# Patient Record
Sex: MALE | Race: WHITE
[De-identification: names, ages, dates, MRNs, and addresses within clinical notes are randomized per-mention and may not be internally consistent; named-entity substitution may affect disease eponyms.]

---

## 2020-04-16 NOTE — RAD
PA and lateral views of the chest. 

 

Comparison:  Chest radiograph dated 4/7/2014.

 

Indication:  Shortness of air

 

Findings:

 

Normal lung volume. No focal airspace disease. Normal pulmonary 

vasculature. No pleural effusion. No pneumothorax. The cardiomediastinal 

silhouette is normal in appearance. The great vessels are normal. No acute

osseous abnormality.

 

Impression: 

No acute cardiopulmonary process.

 

Electronically signed by: Urban Tompkins MD (4/16/2020 9:20 AM) 

[id number]

## 2020-06-08 ENCOUNTER — HOSPITAL ENCOUNTER (EMERGENCY)
Dept: HOSPITAL 63 - ER | Age: 35
Discharge: HOME | End: 2020-06-08
Payer: COMMERCIAL

## 2020-06-08 VITALS — WEIGHT: 222.67 LBS | BODY MASS INDEX: 29.51 KG/M2 | HEIGHT: 73 IN

## 2020-06-08 VITALS — DIASTOLIC BLOOD PRESSURE: 95 MMHG | SYSTOLIC BLOOD PRESSURE: 146 MMHG

## 2020-06-08 DIAGNOSIS — J45.909: ICD-10-CM

## 2020-06-08 DIAGNOSIS — R20.2: ICD-10-CM

## 2020-06-08 DIAGNOSIS — R07.9: ICD-10-CM

## 2020-06-08 DIAGNOSIS — R03.0: Primary | ICD-10-CM

## 2020-06-08 LAB
ALBUMIN SERPL-MCNC: 4.2 G/DL (ref 3.4–5)
ALBUMIN/GLOB SERPL: 1.4 {RATIO} (ref 1–1.7)
ALP SERPL-CCNC: 86 U/L (ref 46–116)
ALT SERPL-CCNC: 39 U/L (ref 16–63)
ANION GAP SERPL CALC-SCNC: 6 MMOL/L (ref 6–14)
AST SERPL-CCNC: 19 U/L (ref 15–37)
BASOPHILS # BLD AUTO: 0 X10^3/UL (ref 0–0.2)
BASOPHILS NFR BLD: 0 % (ref 0–3)
BILIRUB SERPL-MCNC: 0.6 MG/DL (ref 0.2–1)
BUN/CREAT SERPL: 8 (ref 6–20)
CA-I SERPL ISE-MCNC: 9 MG/DL (ref 8–26)
CALCIUM SERPL-MCNC: 9 MG/DL (ref 8.5–10.1)
CHLORIDE SERPL-SCNC: 103 MMOL/L (ref 98–107)
CO2 SERPL-SCNC: 30 MMOL/L (ref 21–32)
CREAT SERPL-MCNC: 1.1 MG/DL (ref 0.7–1.3)
EOSINOPHIL NFR BLD: 0.2 X10^3/UL (ref 0–0.7)
EOSINOPHIL NFR BLD: 3 % (ref 0–3)
ERYTHROCYTE [DISTWIDTH] IN BLOOD BY AUTOMATED COUNT: 13.6 % (ref 11.5–14.5)
GFR SERPLBLD BASED ON 1.73 SQ M-ARVRAT: 77.1 ML/MIN
GLOBULIN SER-MCNC: 3.1 G/DL (ref 2.2–3.8)
GLUCOSE SERPL-MCNC: 89 MG/DL (ref 70–99)
HCT VFR BLD CALC: 44.6 % (ref 39–53)
HGB BLD-MCNC: 15.6 G/DL (ref 13–17.5)
LYMPHOCYTES # BLD: 1.8 X10^3/UL (ref 1–4.8)
LYMPHOCYTES NFR BLD AUTO: 27 % (ref 24–48)
MCH RBC QN AUTO: 29 PG (ref 25–35)
MCHC RBC AUTO-ENTMCNC: 35 G/DL (ref 31–37)
MCV RBC AUTO: 83 FL (ref 79–100)
MONO #: 0.6 X10^3/UL (ref 0–1.1)
MONOCYTES NFR BLD: 10 % (ref 0–9)
NEUT #: 4 X10^3UL (ref 1.8–7.7)
NEUTROPHILS NFR BLD AUTO: 60 % (ref 31–73)
PLATELET # BLD AUTO: 191 X10^3/UL (ref 140–400)
POTASSIUM SERPL-SCNC: 3.9 MMOL/L (ref 3.5–5.1)
PROT SERPL-MCNC: 7.3 G/DL (ref 6.4–8.2)
RBC # BLD AUTO: 5.41 X10^6/UL (ref 4.3–5.7)
SODIUM SERPL-SCNC: 139 MMOL/L (ref 136–145)
WBC # BLD AUTO: 6.6 X10^3/UL (ref 4–11)

## 2020-06-08 PROCEDURE — 84484 ASSAY OF TROPONIN QUANT: CPT

## 2020-06-08 PROCEDURE — 80053 COMPREHEN METABOLIC PANEL: CPT

## 2020-06-08 PROCEDURE — 71046 X-RAY EXAM CHEST 2 VIEWS: CPT

## 2020-06-08 PROCEDURE — 85025 COMPLETE CBC W/AUTO DIFF WBC: CPT

## 2020-06-08 PROCEDURE — 36415 COLL VENOUS BLD VENIPUNCTURE: CPT

## 2020-06-08 PROCEDURE — 72125 CT NECK SPINE W/O DYE: CPT

## 2020-06-08 PROCEDURE — 96374 THER/PROPH/DIAG INJ IV PUSH: CPT

## 2020-06-08 PROCEDURE — 99285 EMERGENCY DEPT VISIT HI MDM: CPT

## 2020-06-08 PROCEDURE — 93005 ELECTROCARDIOGRAM TRACING: CPT

## 2020-06-08 NOTE — PHYS DOC
Past History


Past Medical History:  Asthma


Past Surgical History:  No Surgical History


Smoking:  Non-smoker


Alcohol Use:  None


Drug Use:  None





General Adult


EDM:


Chief Complaint:  BACK PAIN - NO INJURY





HPI:


HPI:





Patient is a 35 year old male who presents for evaluation of right upper back 

pain and tingling and numbness down his right arm.  Patient denies any injury, 

fall or trauma.  Blood pressure was elevated at 181/115 on arrival.  Patient had

complained of some chest pressure and heartburn earlier in the day.  Patient 

denies any other symptoms including sweats or dizziness.  Patient does not have 

any known cardiac risk factors.  Patient states blood pressure normally runs 

much better than that but he does not take blood pressure medication





Review of Systems:


Review of Systems:


Constitutional:  Denies fever or chills 


Eyes:  Denies change in visual acuity 


HENT:  Denies nasal congestion or sore throat 


Respiratory:  Denies cough or shortness of breath 


Cardiovascular:  has chest pressure but no edema 


GI:  Denies abdominal pain, nausea, vomiting, bloody stools or diarrhea 


: Denies dysuria 


Musculoskeletal:  Denies back pain or joint pain 


Integument:  Denies rash 


Neurologic:  Denies headache, focal weakness or sensory changes 


Endocrine:  Denies polyuria or polydipsia 


Lymphatic:  Denies swollen glands 


Psychiatric:  Denies depression or anxiety





Heart Score:


HEART Score for Chest Pain:  








HEART Score for Chest Pain Response (Comments) Value


 


History Slighlty/Non-Suspicious 0


 


ECG Normal 0


 


Age < 45 0


 


Risk Factors No Risk Factors 0


 


Troponin < Normal Limit 0


 


Total  0








Risk Factors:


Risk Factors:  DM, Current or recent (<one month) smoker, HTN, HLP, family 

history of CAD, obesity.


Risk Scores:


Score 0 - 3:  2.5% MACE over next 6 weeks - Discharge Home


Score 4 - 6:  20.3% MACE over next 6 weeks - Admit for Clinical Observation


Score 7 - 10:  72.7% MACE over next 6 weeks - Early Invasive Strategies





Physical Exam:


PE:





Constitutional: Well developed, well nourished, mild acute distress, non-toxic 

appearance. []


HENT: Normocephalic, atraumatic, bilateral external ears normal, oropharynx 

moist, no oral exudates, nose normal. []


Eyes: PERRL, EOMI, conjunctiva normal, no discharge. [] 


Neck: Normal range of motion, no tenderness, supple, no stridor. [] 


Cardiovascular:Heart rate regular rhythm, no murmur []


Lungs & Thorax:  Bilateral breath sounds clear to auscultation []


Abdomen: Bowel sounds normal, soft, no tenderness, no masses, no pulsatile 

masses. [] 


Skin: Warm, dry, no erythema, no rash. [] 


Back: No tenderness, no CVA tenderness. [] 


Extremities: No tenderness, no cyanosis, no clubbing, ROM intact, no edema.  

Gross sensation intact both arms [] 


Neurologic: Alert and oriented X 3, normal motor function, normal sensory 

function, no focal deficits noted. []


Psychologic: Affect normal, judgement normal, mood normal. []





Current Patient Data:


Labs:





Laboratory Tests








Test


 20


19:40


 


White Blood Count 6.6 x10^3/uL 


 


Red Blood Count 5.41 x10^6/uL 


 


Hemoglobin 15.6 g/dL 


 


Hematocrit 44.6 % 


 


Mean Corpuscular Volume 83 fL 


 


Mean Corpuscular Hemoglobin 29 pg 


 


Mean Corpuscular Hemoglobin


Concent 35 g/dL 





 


Red Cell Distribution Width 13.6 % 


 


Platelet Count 191 x10^3/uL 


 


Neutrophils (%) (Auto) 60 % 


 


Lymphocytes (%) (Auto) 27 % 


 


Monocytes (%) (Auto) 10 % 


 


Eosinophils (%) (Auto) 3 % 


 


Basophils (%) (Auto) 0 % 


 


Neutrophils # (Auto) 4.0 x10^3uL 


 


Lymphocytes # (Auto) 1.8 x10^3/uL 


 


Monocytes # (Auto) 0.6 x10^3/uL 


 


Eosinophils # (Auto) 0.2 x10^3/uL 


 


Basophils # (Auto) 0.0 x10^3/uL 


 


Sodium Level 139 mmol/L 


 


Potassium Level 3.9 mmol/L 


 


Chloride Level 103 mmol/L 


 


Carbon Dioxide Level 30 mmol/L 


 


Anion Gap 6 


 


Blood Urea Nitrogen 9 mg/dL 


 


Creatinine 1.1 mg/dL 


 


Estimated GFR


(Cockcroft-Gault) 77.1 





 


BUN/Creatinine Ratio 8 


 


Glucose Level 89 mg/dL 


 


Calcium Level 9.0 mg/dL 


 


Total Bilirubin 0.6 mg/dL 


 


Aspartate Amino Transf


(AST/SGOT) 19 U/L 





 


Alanine Aminotransferase


(ALT/SGPT) 39 U/L 





 


Alkaline Phosphatase 86 U/L 


 


Troponin I Quantitative < 0.017 ng/mL 


 


Total Protein 7.3 g/dL 


 


Albumin 4.2 g/dL 


 


Albumin/Globulin Ratio 1.4 








Current Medications








 Medications


  (Trade)  Dose


 Ordered  Sig/Estela


 Route


 PRN Reason  Start Time


 Stop Time Status Last Admin


Dose Admin


 


 Hydralazine HCl


  (Apresoline)  10 mg  1X  ONCE


 IV


   20 20:30


 20 20:33 DC 20 20:30











Vital Signs:





                                   Vital Signs








  Date Time  Temp Pulse Resp B/P (MAP) Pulse Ox O2 Delivery O2 Flow Rate FiO2


 


20 19:20 97.1 71 20 118/115 (116) 97 Room Air  











EKG:


EKG:


EKG read at 1946 showed normal sinus rhythm, essentially normal EKG, not STEMI, 

rate 68 []





Radiology/Procedures:


Radiology/Procedures:


                               [facility] [address], [place] [address] [phone]


                                        


                                 IMAGING REPORT





                                     Signed





PATIENT: LALI DUQUE    ACCOUNT: [id number]     MRN#: [id number]


: 1985           LOCATION: ER              AGE: 35


SEX: M                    EXAM DT: 20         ACCESSION#: [id number]


STATUS: REG ER            ORD. PHYSICIAN: YESICA DE LA ROSA DO


REASON: chest pressure, upper back pain, RIGHT ARM TINGLING.


PROCEDURE: CHEST PA & LATERAL





CHEST PA   LATERAL


 


History: Chest pressure, upper back pain, RIGHT ARM TINGLING. 


 


Comparison: Two-view chest, 2020.


 


Findings: 


The cardiomediastinal silhouette is normal. Pulmonary vasculature is 


normal. The lungs are clear. No pleural effusion or pneumothorax is seen. 


There is no acute bone abnormality. 


 


IMPRESSION: 


No acute cardiopulmonary process. 


 


 


Electronically signed by: Carlos Coronel MD (2020 8:05 PM) [facility]














DICTATED AND SIGNED BY:     CARLOS CORONEL MD


DATE:     20





CC: JENNIFER RAYMUNDO MD; YESICA DE LA ROSA DO ~


Impressions:


                               [facility] [address], [place] [address] [phone]


                                        


                                 IMAGING REPORT





                                     Signed





PATIENT: LALI DUQUE    ACCOUNT: [id number]     MRN#: [id number]


: 1985           LOCATION: ER              AGE: 35


SEX: M                    EXAM DT: 20         ACCESSION#: [id number]


STATUS: REG ER            ORD. PHYSICIAN: YESICA DE LA ROSA DO


REASON: neck pain, right arm paresthesia


PROCEDURE: CT CERVICAL SPINE WO CONTRAST





PQRS Compliance Statement:


 


One or more of the following individualized dose reduction techniques were


utilized for this examination:  


1. Automated exposure control  


2. Adjustment of the mA and/or kV according to patient size  


3. Use of iterative reconstruction technique


 


 


CT CERVICAL SPINE WITHOUT CONTRAST


 


Clinical Indication: neck pain, right arm paresthesia / Spl. Instructions:


/ History: 


 


Comparison: None. 


 


Technique: Noncontrast helical CT of the cervical spine was performed.  


Axial, sagittal, and coronal reconstructions were obtained. 


 


Findings:


There is no evidence of acute fracture or acute malalignment. 


 


The vertebral body height and alignment are maintained. There are no 


perched or jumped facet joints. Straightening of normal cervical lordosis 


may be positional or due to muscle spasm. There is mild degenerative 


endplate spurring. There is no high-grade disc space narrowing of the 


cervical spine. 


 


There is suggestion of a 7 mm right thyroid nodule. There is no 


significant central canal stenosis or neural foraminal narrowing of the 


cervical spine. No significant spondylitic disc is identified. The 


visualized lung apices are clear. 


 


IMPRESSION:


1.  No acute fracture or malalignment.


2.  The central canal and neural foramina are without significant 


stenosis.


 


Electronically signed by: Carlos Coronel MD (2020 8:35 PM) [facility]














DICTATED AND SIGNED BY:     CARLOS CORONEL MD


DATE:     20





CC: JENNIFER RAYMUNDO MD; YESICA DE LA ROSA DO ~





Course & Med Decision Making:


Course & Med Decision Making


Pertinent Labs and Imaging studies reviewed. (See chart for details)





[]





Dragon Disclaimer:


Dragon Disclaimer:


This electronic medical record was generated, in whole or in part, using a voice

 recognition dictation system.





 stable, CT scan cervical spine unremarkable and there is no fracture, 

neuroforaminal narrowing etc.  Chest x-ray and cardiac work-up unremarkable.  

Differential diagnosis included neurological injury, cardiac injury versus 

other.  Close follow-up recommended.  Will start patient on metoprolol.  

Detailed follow-up instructions given and all questions answered.  Patient 

neurovascularly intact both of his arms.  Systolic blood pressure improved 

before discharge





Departure


Departure:


Impression:  


   Primary Impression:  


   Elevated blood pressure reading


   Additional Impressions:  


   Paresthesia of right arm


   Chest pressure


Disposition:  01 HOME/RESIDENCE PRIOR TO ADM


Condition:  STABLE


Referrals:  


JENNIFER RAYMUNDO MD (PCP)


Patient Instructions:  Chest Pain (Nonspecific), Easy-to-Read, Hypertension, 

Paresthesia





Additional Instructions:  


Rest your affected right arm.  Keep close track of your home blood pressures.  

You will need to see your doctor right away and you may need to be on long-term 

blood pressure medication.  Return if worsen


Scripts


Metoprolol Succinate (METOPROLOL SUCCINATE ( XL )) 50 Mg Tab.er.24h


1 TAB PO DAILY for hypertension, #30 TAB 5 Refills


   Prov: YESICA DE LA ROSA DO         20





Justification of Admission:


Justification of Admission:


Justification of Admission Dx:  N/A











YESICA DE L AROSA DO               2020 19:47

## 2020-06-08 NOTE — RAD
CHEST PA   LATERAL

 

History: Chest pressure, upper back pain, RIGHT ARM TINGLING. 

 

Comparison: Two-view chest, April 16, 2020.

 

Findings: 

The cardiomediastinal silhouette is normal. Pulmonary vasculature is 

normal. The lungs are clear. No pleural effusion or pneumothorax is seen. 

There is no acute bone abnormality. 

 

IMPRESSION: 

No acute cardiopulmonary process. 

 

 

Electronically signed by: Carlos Coronel MD (6/8/2020 8:05 PM) [facility]

## 2020-06-08 NOTE — RAD
PQRS Compliance Statement:

 

One or more of the following individualized dose reduction techniques were

utilized for this examination:  

1. Automated exposure control  

2. Adjustment of the mA and/or kV according to patient size  

3. Use of iterative reconstruction technique

 

 

CT CERVICAL SPINE WITHOUT CONTRAST

 

Clinical Indication: neck pain, right arm paresthesia / Spl. Instructions:

/ History: 

 

Comparison: None. 

 

Technique: Noncontrast helical CT of the cervical spine was performed.  

Axial, sagittal, and coronal reconstructions were obtained. 

 

Findings:

There is no evidence of acute fracture or acute malalignment. 

 

The vertebral body height and alignment are maintained. There are no 

perched or jumped facet joints. Straightening of normal cervical lordosis 

may be positional or due to muscle spasm. There is mild degenerative 

endplate spurring. There is no high-grade disc space narrowing of the 

cervical spine. 

 

There is suggestion of a 7 mm right thyroid nodule. There is no 

significant central canal stenosis or neural foraminal narrowing of the 

cervical spine. No significant spondylitic disc is identified. The 

visualized lung apices are clear. 

 

IMPRESSION:

1.  No acute fracture or malalignment.

2.  The central canal and neural foramina are without significant 

stenosis.

 

Electronically signed by: Carlos Coronel MD (6/8/2020 8:35 PM) [facility]

## 2020-06-11 NOTE — EKG
[facility] [address], [place] [address]

Test Date:    2020               Test Time:    19:41:52

Pat Name:     LALI DUQUE              Department:   

Patient ID:   [id number]           Room:          

Gender:                                Technician:   

:          1985               Requested By: YESICA DE LA ROSA

Order Number: [id number]            Reading MD:   Alok Burk MD

                                 Measurements

Intervals                              Axis          

Rate:                                  P:            

ND:                                    QRS:          

QRSD:                                  T:            

QT:                                                  

QTc:                                                 

                           Interpretive Statements

SR

NON-SPECIFIC ST/T CHANGES



Electronically Signed On 6- 13:27:09 CDT by Alok Burk MD

## 2021-06-08 ENCOUNTER — HOSPITAL ENCOUNTER (EMERGENCY)
Dept: HOSPITAL 63 - ER | Age: 36
Discharge: HOME | End: 2021-06-08
Payer: COMMERCIAL

## 2021-06-08 VITALS — SYSTOLIC BLOOD PRESSURE: 132 MMHG | DIASTOLIC BLOOD PRESSURE: 72 MMHG

## 2021-06-08 VITALS — HEIGHT: 73 IN | WEIGHT: 286.6 LBS | BODY MASS INDEX: 37.98 KG/M2

## 2021-06-08 DIAGNOSIS — I10: ICD-10-CM

## 2021-06-08 DIAGNOSIS — K21.9: ICD-10-CM

## 2021-06-08 DIAGNOSIS — J45.909: ICD-10-CM

## 2021-06-08 DIAGNOSIS — G58.9: Primary | ICD-10-CM

## 2021-06-08 DIAGNOSIS — R20.2: ICD-10-CM

## 2021-06-08 LAB
ANION GAP SERPL CALC-SCNC: 10 MMOL/L (ref 6–14)
BASOPHILS # BLD AUTO: 0 X10^3/UL (ref 0–0.2)
BASOPHILS NFR BLD: 0 % (ref 0–3)
CA-I SERPL ISE-MCNC: 16 MG/DL (ref 8–26)
CALCIUM SERPL-MCNC: 9.2 MG/DL (ref 8.5–10.1)
CHLORIDE SERPL-SCNC: 103 MMOL/L (ref 98–107)
CO2 SERPL-SCNC: 29 MMOL/L (ref 21–32)
CREAT SERPL-MCNC: 1.2 MG/DL (ref 0.7–1.3)
EOSINOPHIL NFR BLD: 0.2 X10^3/UL (ref 0–0.7)
EOSINOPHIL NFR BLD: 2 % (ref 0–3)
ERYTHROCYTE [DISTWIDTH] IN BLOOD BY AUTOMATED COUNT: 13.1 % (ref 11.5–14.5)
GFR SERPLBLD BASED ON 1.73 SQ M-ARVRAT: 69.3 ML/MIN
GLUCOSE SERPL-MCNC: 96 MG/DL (ref 70–99)
HCT VFR BLD CALC: 44.5 % (ref 39–53)
HGB BLD-MCNC: 15.7 G/DL (ref 13–17.5)
LYMPHOCYTES # BLD: 1.4 X10^3/UL (ref 1–4.8)
LYMPHOCYTES NFR BLD AUTO: 21 % (ref 24–48)
MCH RBC QN AUTO: 29 PG (ref 25–35)
MCHC RBC AUTO-ENTMCNC: 35 G/DL (ref 31–37)
MCV RBC AUTO: 82 FL (ref 79–100)
MONO #: 0.6 X10^3/UL (ref 0–1.1)
MONOCYTES NFR BLD: 9 % (ref 0–9)
NEUT #: 4.4 X10^3UL (ref 1.8–7.7)
NEUTROPHILS NFR BLD AUTO: 67 % (ref 31–73)
PLATELET # BLD AUTO: 195 X10^3/UL (ref 140–400)
POTASSIUM SERPL-SCNC: 4 MMOL/L (ref 3.5–5.1)
RBC # BLD AUTO: 5.41 X10^6/UL (ref 4.3–5.7)
SODIUM SERPL-SCNC: 142 MMOL/L (ref 136–145)
WBC # BLD AUTO: 6.5 X10^3/UL (ref 4–11)

## 2021-06-08 PROCEDURE — 93005 ELECTROCARDIOGRAM TRACING: CPT

## 2021-06-08 PROCEDURE — 71045 X-RAY EXAM CHEST 1 VIEW: CPT

## 2021-06-08 PROCEDURE — 80048 BASIC METABOLIC PNL TOTAL CA: CPT

## 2021-06-08 PROCEDURE — 84484 ASSAY OF TROPONIN QUANT: CPT

## 2021-06-08 PROCEDURE — 85025 COMPLETE CBC W/AUTO DIFF WBC: CPT

## 2021-06-08 PROCEDURE — 36415 COLL VENOUS BLD VENIPUNCTURE: CPT

## 2021-06-08 NOTE — RAD
INDICATION: Reason: tingling in right arm / Spl. Instructions:  / History: 



COMPARISON: June 8, 2020



FINDINGS:



Single view of chest obtained.

Cardiac mediastinal contour is enlarged.

A well-defined focal airspace consolidation is not seen. No gross osseous destructive lesion.





IMPRESSION:



*  No focal airspace consolidation.



Electronically signed by: Jann Huang MD (6/8/2021 2:27 PM) [id number]

## 2021-06-08 NOTE — PHYS DOC
Past History


Past Medical History:  Asthma, GERD, Hypertension


Past Surgical History:  No Surgical History


Smoking:  Non-smoker


Alcohol Use:  Rarely


Drug Use:  None





General Adult


EDM:


Chief Complaint:  OTHER COMPLAINTS





HPI:


HPI:





Patient is a 36-year-old male presents with tingling to his right arm.  Patient 

states that he woke up and noticed that his right arm was tingling.  Patient 

states he was seen at urgent care this morning and told to return to the 

emergency room if it happened again.  Patient states he started having tingling 

in his arm about 45 minutes ago.  Patient reports the tingling comes and goes 

and only lasts a few minutes.  Patient denies pain, weakness.  Denies known 

injury.  Patient has history of hypertension, GERD.





Review of Systems:


Review of Systems:


Constitutional:  Denies fever or chills 


Eyes:  Denies change in visual acuity 


HENT:  Denies nasal congestion or sore throat 


Respiratory:  Denies cough or shortness of breath 


Cardiovascular:  Denies chest pain or edema 


GI:  Denies abdominal pain, nausea, vomiting, bloody stools or diarrhea 


: Denies dysuria 


Musculoskeletal:   Denies back pain or joint pain.


Integument:  Denies rash 


Neurologic:  Reports right arm tingling.Denies headache, focal weakness or 

sensory changes 


Endocrine:  Denies polyuria or polydipsia 


Lymphatic:  Denies swollen glands 


Psychiatric:  Denies depression or anxiety





Allergies:


Allergies:





Allergies








Coded Allergies Type Severity Reaction Last Updated Verified


 


  No Known Drug Allergies    6/8/20 No











Physical Exam:


PE:





Constitutional: Well developed, well nourished, no acute distress, non-toxic 

appearance. 


HENT: Normocephalic, atraumatic, bilateral external ears normal, oropharynx 

moist, no oral exudates, nose normal. 


Eyes: PERRLA, EOMI, conjunctiva normal, no discharge.  


Neck: Normal range of motion, no tenderness, supple, no stridor.  


Cardiovascular:Heart rate regular rhythm, no murmur 


Lungs & Thorax:  Bilateral breath sounds clear to auscultation 


Abdomen: Bowel sounds normal, soft, no tenderness, no masses, no pulsatile 

masses. [] 


Skin: Warm, dry, no erythema, no rash. 


Back: No tenderness, no CVA tenderness. 


Extremities:  right arm tingling, no pain, no cyanosis,  ROM intact, no edema.  


Neurologic: Alert and oriented X 3, normal motor function, normal sensory 

function, no focal deficits noted. 


Psychologic: Affect normal, judgement normal, mood normal.





Current Patient Data:


Vital Signs:





                                   Vital Signs








  Date Time  Temp Pulse Resp B/P (MAP) Pulse Ox O2 Delivery O2 Flow Rate FiO2


 


6/8/21 13:16 98.1 74 16 142/80 (100) 97 Room Air  











EKG:


EKG:


[]





Radiology/Procedures:


Radiology/Procedures:


[]NDICATION: Reason: tingling in right arm / Spl. Instructions:  / History: 





COMPARISON: June 8, 2020





FINDINGS:





Single view of chest obtained.


Cardiac mediastinal contour is enlarged.


A well-defined focal airspace consolidation is not seen. No gross osseous de

structive lesion.








IMPRESSION:





*  No focal airspace consolidation.





Electronically signed by: Jann Huang MD (6/8/2021 2:27 PM) [id number]





Heart Score:


C/O Chest Pain:  No


Risk Factors:


Risk Factors:  DM, Current or recent (<one month) smoker, HTN, HLP, family 

history of CAD, obesity.


Risk Scores:


Score 0 - 3:  2.5% MACE over next 6 weeks - Discharge Home


Score 4 - 6:  20.3% MACE over next 6 weeks - Admit for Clinical Observation


Score 7 - 10:  72.7% MACE over next 6 weeks - Early Invasive Strategies





Course & Med Decision Making:


Course & Med Decision Making


Pertinent Labs and Imaging studies reviewed. (See chart for details)





[] Patient 36-year male presents with tingling to his right arm that started 

about 730 this morning.  Patient was seen in urgent care and told to return to 

the emergency room if symptoms came back.  Patient states that he started having

 tingling in his arm about 45 minutes ago.  Patient denies pain or weakness, 

denies numbness.  Patient is alert and oriented X 3.  Normal sensory function, 

no focal deficits noted noted on neuro exam.  Performed Phalen's test which was 

negative.  All labs are unremarkable.  Troponin is negative.  EKG shows sinus 

rhythm.  Patient most likely has a pinched nerve.  Advised patient to follow-up 

with Dr. Raymundo, his PCP tomorrow for further management.  Patient given strict

 return precautions.  Patient is hemodynamically stable and able to ambulate on 

his own out of the emergency room.  Patient is appreciative and okay with 

discharge plan.





Dragon Disclaimer:


Dragon Disclaimer:


This electronic medical record was generated, in whole or in part, using a voice

 recognition dictation system.





Departure


Departure:


Impression:  


   Primary Impression:  


   Pinched nerve


   Additional Impression:  


   Tingling of right upper extremity


Disposition:  01 HOME / SELF CARE / HOMELESS


Condition:  STABLE


Referrals:  


JENNIFER RAYMUNDO MD (PCP)


Patient Instructions:  Pinched Nerve





Additional Instructions:  


You were seen in the emergency room for right arm tingling.  Your labs were all 

unremarkable.  Please call Dr. Raymundo, for a follow-up appointment for further 

management.  Return to the emergency room if you have worsening symptoms or 

concerns.


EMERGENCY DEPARTMENT GENERAL DISCHARGE INSTRUCTIONS





Thank you for coming to [facility] (ED) today and trusting us

 with you 


care.  We trust that you had a positivie experience in our Emergency Department.

  If you 


wish to speak to the department management, you may call the director at 

[phone].





YOUR FOLLOW UP INSTRUCTIONS ARE AS FOLLOWS:





1.  Do you have a private Doctor?  If you do not have a private doctor, please 

ask for a 


resource list of physicians or clinics that may be able to assist you with 

follow up care.





2.  The Emergency Physician has interpreted your x-rays.  The X-Ray specialist 

will also 


review them.  If there is a change in the findings, you will be notified in 48 

hours when at 


all possible.





3.  A lab test or culture has been done, your results will be reviewed and you 

will be 


notified if you need a change in treatment.





ADDITIONAL INSTRUCTIONS AND INFORMATION:





1.  Your care today has been supervised by a physician who is specially trained 

in emergency 


care.  Many problems require more than one evaluation for a complete diagnosis 

and 


treatment.  We recommend that you schedule your follow up appointment as 

recommended to 


ensure complete treatment of you illness or injury.  If you are unable to obtain

 follow up 


care and continue to have a problem, or if your condition worsens, we recommend 

that you 


return to the ED.





2.  We are not able to safely determine your condition over the phone nor are we

 able to 


give sound medical advice over the phone.  For these safety reasons, if you call

 for medical 


advice we will ask you to come to the ED for further evaluation.





3.  If you have any questions regarding these discharge instructions please call

 the ED at 


[phone].





SAFETY INFORMATION:





In the interest of safety, wellness, and injury prevention; we encourage you to 

wear your 


sealbelt, if you smoke; quite smoking, and we encourage family to use a 

protective helmet 


for bicycling and other sporting events that present an increased risk for head 

injury.





IF YOUR SYMPTOMS WORSEN OR NEW SYMPTOMS DEVELOP, OR YOU HAVE CONCERNS ABOUT YOUR

 CONDITION; 


OR IF YOUR CONDITION WORSENS WHILE YOU ARE WAITING FOR YOUR FOLLOW UP 

APPOINTMENT; EITHER 


CONTACT YOUR PRIMARY CARE DOCTOR, THE PHYSICIAN WHOSE NAME AND NUMBER YOU WERE 

GIVEN, OR 


RETURN TO THE ED IMMEDIATELY.


Scripts


Cyclobenzaprine Hcl (CYCLOBENZAPRINE HCL) 10 Mg Tablet


1 TAB PO TID PRN for PAIN for 10 Days, #30 TAB 0 Refills


   Prov: RADHA MARTINI         6/8/21











RADHA MARTINI                Jun 8, 2021 14:07

## 2021-06-08 NOTE — EKG
[facility] [address], [place] [address]

Test Date:    2021               Test Time:    14:04:35

Pat Name:     LALI DUQUE              Department:   

Patient ID:   [id number]           Room:          

Gender:       M                        Technician:   CELSO

:          1985               Requested By: RADHA MARTINI

Order Number: [id number]            Reading MD:     

                                 Measurements

Intervals                              Axis          

Rate:         61                       P:            29

WI:           172                      QRS:          47

QRSD:         98                       T:            40

QT:           360                                    

QTc:          364                                    

                           Interpretive Statements

SINUS RHYTHM

OTHERWISE NORMAL ECG

RI6.02

No previous ECG available for comparison

## 2022-03-24 ENCOUNTER — HOSPITAL ENCOUNTER (EMERGENCY)
Dept: HOSPITAL 63 - ER | Age: 37
Discharge: HOME | End: 2022-03-24
Payer: COMMERCIAL

## 2022-03-24 VITALS — WEIGHT: 286.6 LBS | BODY MASS INDEX: 37.98 KG/M2 | HEIGHT: 73 IN

## 2022-03-24 VITALS — SYSTOLIC BLOOD PRESSURE: 115 MMHG | DIASTOLIC BLOOD PRESSURE: 50 MMHG

## 2022-03-24 DIAGNOSIS — R00.0: ICD-10-CM

## 2022-03-24 DIAGNOSIS — F41.9: ICD-10-CM

## 2022-03-24 DIAGNOSIS — J45.901: Primary | ICD-10-CM

## 2022-03-24 DIAGNOSIS — K21.9: ICD-10-CM

## 2022-03-24 DIAGNOSIS — I10: ICD-10-CM

## 2022-03-24 LAB
ALBUMIN SERPL-MCNC: 4.3 G/DL (ref 3.4–5)
ALBUMIN/GLOB SERPL: 1.4 {RATIO} (ref 1–1.7)
ALP SERPL-CCNC: 104 U/L (ref 46–116)
ALT SERPL-CCNC: 31 U/L (ref 16–63)
ANION GAP SERPL CALC-SCNC: 10 MMOL/L (ref 6–14)
AST SERPL-CCNC: 18 U/L (ref 15–37)
BASOPHILS # BLD AUTO: 0 X10^3/UL (ref 0–0.2)
BASOPHILS NFR BLD: 0 % (ref 0–3)
BILIRUB SERPL-MCNC: 0.6 MG/DL (ref 0.2–1)
BUN/CREAT SERPL: 13 (ref 6–20)
CA-I SERPL ISE-MCNC: 14 MG/DL (ref 8–26)
CALCIUM SERPL-MCNC: 9.2 MG/DL (ref 8.5–10.1)
CHLORIDE SERPL-SCNC: 100 MMOL/L (ref 98–107)
CO2 SERPL-SCNC: 25 MMOL/L (ref 21–32)
CREAT SERPL-MCNC: 1.1 MG/DL (ref 0.7–1.3)
EOSINOPHIL NFR BLD: 0.1 X10^3/UL (ref 0–0.7)
EOSINOPHIL NFR BLD: 1 % (ref 0–3)
ERYTHROCYTE [DISTWIDTH] IN BLOOD BY AUTOMATED COUNT: 13.6 % (ref 11.5–14.5)
GFR SERPLBLD BASED ON 1.73 SQ M-ARVRAT: 76.6 ML/MIN
GLOBULIN SER-MCNC: 3 G/DL (ref 2.2–3.8)
GLUCOSE SERPL-MCNC: 138 MG/DL (ref 70–99)
HCT VFR BLD CALC: 46.3 % (ref 39–53)
HGB BLD-MCNC: 16.2 G/DL (ref 13–17.5)
LYMPHOCYTES # BLD: 1.4 X10^3/UL (ref 1–4.8)
LYMPHOCYTES NFR BLD AUTO: 20 % (ref 24–48)
MCH RBC QN AUTO: 29 PG (ref 25–35)
MCHC RBC AUTO-ENTMCNC: 35 G/DL (ref 31–37)
MCV RBC AUTO: 82 FL (ref 79–100)
MONO #: 0.3 X10^3/UL (ref 0–1.1)
MONOCYTES NFR BLD: 4 % (ref 0–9)
NEUT #: 5.1 X10^3UL (ref 1.8–7.7)
NEUTROPHILS NFR BLD AUTO: 75 % (ref 31–73)
PLATELET # BLD AUTO: 211 X10^3/UL (ref 140–400)
POTASSIUM SERPL-SCNC: 3.7 MMOL/L (ref 3.5–5.1)
PROT SERPL-MCNC: 7.3 G/DL (ref 6.4–8.2)
RBC # BLD AUTO: 5.65 X10^6/UL (ref 4.3–5.7)
SODIUM SERPL-SCNC: 135 MMOL/L (ref 136–145)
WBC # BLD AUTO: 6.8 X10^3/UL (ref 4–11)

## 2022-03-24 PROCEDURE — 84443 ASSAY THYROID STIM HORMONE: CPT

## 2022-03-24 PROCEDURE — 36415 COLL VENOUS BLD VENIPUNCTURE: CPT

## 2022-03-24 PROCEDURE — 85379 FIBRIN DEGRADATION QUANT: CPT

## 2022-03-24 PROCEDURE — 94640 AIRWAY INHALATION TREATMENT: CPT

## 2022-03-24 PROCEDURE — 84484 ASSAY OF TROPONIN QUANT: CPT

## 2022-03-24 PROCEDURE — 80053 COMPREHEN METABOLIC PANEL: CPT

## 2022-03-24 PROCEDURE — 71045 X-RAY EXAM CHEST 1 VIEW: CPT

## 2022-03-24 PROCEDURE — 99284 EMERGENCY DEPT VISIT MOD MDM: CPT

## 2022-03-24 PROCEDURE — 85025 COMPLETE CBC W/AUTO DIFF WBC: CPT

## 2022-03-24 PROCEDURE — 83880 ASSAY OF NATRIURETIC PEPTIDE: CPT

## 2022-03-24 PROCEDURE — 96374 THER/PROPH/DIAG INJ IV PUSH: CPT

## 2022-03-24 PROCEDURE — 96361 HYDRATE IV INFUSION ADD-ON: CPT

## 2022-03-24 NOTE — PHYS DOC
Past History


Past Medical History:  Asthma, GERD, Hypertension


Past Surgical History:  No Surgical History


Smoking:  Non-smoker


Alcohol Use:  Occasionally


Drug Use:  None





Adult General


Chief Complaint


Chief Complaint:  SHORTNESS OF BREATH





HPI


HPI





Patient is a 36-year-old male presenting to the emergency department for 

evaluation of shortness of breath that he says has been worsening since he woke 

up this morning.  He went to an urgent care and received Xopenex and a steroid 

but he said 30 minutes prior to arrival he started feeling more short of breath 

and feels very anxious.  He denied any chest pain diaphoresis nausea vomiting or

other systemic symptoms.  He says he used Xopenex shortly prior to coming here. 

He does have a history of asthma and uses trilogy at home.  He says that he has 

hypertension but no diabetes high cholesterol smoking history.  He appears to be

anxious but nontoxic with normal vital signs other than slight tachycardia and 

hypertension noted.





Review of Systems


Review of Systems





Constitutional: Denies fever or chills []


Eyes: Denies change in visual acuity, redness, or eye pain []


HENT: Denies nasal congestion or sore throat []


Respiratory: Denies cough.  + shortness of breath []


Cardiovascular: No additional information not addressed in HPI []


GI: Denies abdominal pain, nausea, vomiting, bloody stools or diarrhea []


: Denies dysuria or hematuria []


Musculoskeletal: Denies back pain or joint pain []


Integument: Denies rash or skin lesions []


Neurologic: Denies headache, focal weakness or sensory changes []





All other systems were reviewed and found to be within normal limits, except as 

documented in this note.





Current Medications


Current Medications





Current Medications








 Medications


  (Trade)  Dose


 Ordered  Sig/Estela  Start Time


 Stop Time Status Last Admin


Dose Admin


 


 Albuterol/


 Ipratropium


  (Duoneb)  3 ml  1X  ONCE  3/24/22 15:30


 3/24/22 15:35 DC 3/24/22 15:30


3 ML


 


 Lorazepam


  (Ativan Inj)  1.5 mg  1X  ONCE  3/24/22 15:30


 3/24/22 15:35 DC 3/24/22 15:52


1.5 MG


 


 Sodium Chloride  1,000 ml @ 


 1,000 mls/hr  1X  ONCE  3/24/22 15:30


 3/24/22 16:29  3/24/22 15:52


1,000 MLS/HR











Allergies


Allergies





Allergies








Coded Allergies Type Severity Reaction Last Updated Verified


 


  No Known Drug Allergies    6/8/20 No











Physical Exam


Physical Exam





Constitutional: Well developed, well nourished, no acute distress, non-toxic 

appearance. []


HENT: Normocephalic, atraumatic, bilateral external ears normal, oropharynx 

moist, no oral exudates, nose normal. []


Eyes: PERRLA, EOMI, conjunctiva normal, no discharge. [] 


Neck: Normal range of motion, no tenderness, supple, no stridor. [] 


Cardiovascular: Tachycardic heart rate regular rhythm, no murmur []


Lungs & Thorax:  Bilateral breath with slight expiratory wheeze noted


Abdomen: Bowel sounds normal, soft, no tenderness, no masses, no pulsatile 

masses. [] 


Skin: Warm, dry, no erythema, no rash. [] 


Back: No tenderness, no CVA tenderness. [] 


Extremities: No tenderness, no cyanosis, no clubbing, ROM intact, no edema. [] 


Neurologic: Alert and oriented X 3, normal motor function, normal sensory 

function, no focal deficits noted. []


Psychologic: Affect normal, judgement normal, mood normal. []





Current Patient Data


Vital Signs





                                   Vital Signs








  Date Time  Temp Pulse Resp B/P (MAP) Pulse Ox O2 Delivery O2 Flow Rate FiO2


 


3/24/22 15:46      Room Air  0.21


 


3/24/22 15:21 97.9 124 24 146/93 (110) 98   








Lab Results





                                Laboratory Tests








Test


 3/24/22


15:26


 


White Blood Count


 6.8 x10^3/uL


(4.0-11.0)


 


Red Blood Count


 5.65 x10^6/uL


(4.30-5.70)


 


Hemoglobin


 16.2 g/dL


(13.0-17.5)


 


Hematocrit


 46.3 %


(39.0-53.0)


 


Mean Corpuscular Volume


 82 fL ()





 


Mean Corpuscular Hemoglobin 29 pg (25-35)  


 


Mean Corpuscular Hemoglobin


Concent 35 g/dL


(31-37)


 


Red Cell Distribution Width


 13.6 %


(11.5-14.5)


 


Platelet Count


 211 x10^3/uL


(140-400)


 


Neutrophils (%) (Auto) 75 % (31-73)  H


 


Lymphocytes (%) (Auto) 20 % (24-48)  L


 


Monocytes (%) (Auto) 4 % (0-9)  


 


Eosinophils (%) (Auto) 1 % (0-3)  


 


Basophils (%) (Auto) 0 % (0-3)  


 


Neutrophils # (Auto)


 5.1 x10^3uL


(1.8-7.7)


 


Lymphocytes # (Auto)


 1.4 x10^3/uL


(1.0-4.8)


 


Monocytes # (Auto)


 0.3 x10^3/uL


(0.0-1.1)


 


Eosinophils # (Auto)


 0.1 x10^3/uL


(0.0-0.7)


 


Basophils # (Auto)


 0.0 x10^3/uL


(0.0-0.2)











EKG


EKG


[]





Radiology/Procedures


Radiology/Procedures


[]





Heart Score


C/O Chest Pain:  No


Risk Factors:


Risk Factors:  DM, Current or recent (<one month) smoker, HTN, HLP, family 

history of CAD, obesity.


Risk Scores:


Risk Factors:  DM, Current or recent (<one month) smoker, HTN, HLP, family 

history of CAD, obesity.





Course & Med Decision Making


Course & Med Decision Making


Patient may be having mild asthma exacerbation with anxiety.  I will check labs 

and imaging treat symptoms and reassess.





Patient with normal labs and imaging.  His heart rate did go up to 140 after the

 DuoNeb but after receiving Ativan his heart rate improved to 105 and he says 

that he feels much better.  Patient denies any pain and is feeling less short of

 breath and said he feels well and would like to go home.  I told patient to 

continue all the medications he is taking follow with primary care provider 

within 3 to 4 days for recheck and come back to emergency department sooner with

 worsening pain shortness of breath or other general concerns.  Patient aware 

and agreeable with plan and verbalized understanding of the above instructions.





Dragon Disclaimer


Dragon Disclaimer


This electronic medical record was generated, in whole or in part, using a voice

 recognition dictation system.





Departure


Departure:


Impression:  


   Primary Impression:  


   Asthma exacerbation, mild


   Additional Impressions:  


   Anxiety


   Tachycardia


Disposition:  01 HOME / SELF CARE / HOMELESS


Condition:  STABLE


Referrals:  


JENNIFER RAYMUNDO MD (PCP)


Patient Instructions:  Asthma, Adult, Easy-to-Read





Problem Qualifiers











MATT TSE DO             Mar 24, 2022 16:10

## 2022-03-24 NOTE — RAD
AP chest.



HISTORY: Short of air



AP view was taken of the chest. Patient's taken a poor inspiration. There are no acute infiltrates. T
here is no pleural effusion. Heart is normal in size.



IMPRESSION:

1. No acute chest disease.



Electronically signed by: Michele Nguyen MD (3/24/2022 3:49 PM) [facility]